# Patient Record
Sex: MALE | Race: OTHER
[De-identification: names, ages, dates, MRNs, and addresses within clinical notes are randomized per-mention and may not be internally consistent; named-entity substitution may affect disease eponyms.]

---

## 2019-02-11 ENCOUNTER — HOSPITAL ENCOUNTER (EMERGENCY)
Dept: HOSPITAL 15 - ER | Age: 64
Discharge: HOME | End: 2019-02-11
Payer: MEDICAID

## 2019-02-11 VITALS — DIASTOLIC BLOOD PRESSURE: 67 MMHG | SYSTOLIC BLOOD PRESSURE: 130 MMHG

## 2019-02-11 DIAGNOSIS — E11.9: ICD-10-CM

## 2019-02-11 DIAGNOSIS — I10: ICD-10-CM

## 2019-02-11 DIAGNOSIS — Z87.11: ICD-10-CM

## 2019-02-11 DIAGNOSIS — K21.9: ICD-10-CM

## 2019-02-11 DIAGNOSIS — Z89.612: ICD-10-CM

## 2019-02-11 DIAGNOSIS — Z87.440: ICD-10-CM

## 2019-02-11 DIAGNOSIS — J44.9: ICD-10-CM

## 2019-02-11 DIAGNOSIS — Z87.891: ICD-10-CM

## 2019-02-11 DIAGNOSIS — M19.90: ICD-10-CM

## 2019-02-11 DIAGNOSIS — Z46.6: Primary | ICD-10-CM

## 2019-02-11 DIAGNOSIS — Z48.00: ICD-10-CM

## 2019-02-11 DIAGNOSIS — Z98.890: ICD-10-CM

## 2019-02-11 DIAGNOSIS — F20.9: ICD-10-CM

## 2019-02-11 PROCEDURE — 51702 INSERT TEMP BLADDER CATH: CPT

## 2019-11-07 ENCOUNTER — HOSPITAL ENCOUNTER (INPATIENT)
Dept: HOSPITAL 15 - ER | Age: 64
LOS: 1 days | Discharge: HOME | DRG: 466 | End: 2019-11-08
Attending: INTERNAL MEDICINE | Admitting: INTERNAL MEDICINE
Payer: MEDICAID

## 2019-11-07 VITALS — HEIGHT: 67 IN | WEIGHT: 101.63 LBS | BODY MASS INDEX: 15.95 KG/M2

## 2019-11-07 VITALS — SYSTOLIC BLOOD PRESSURE: 110 MMHG | DIASTOLIC BLOOD PRESSURE: 52 MMHG

## 2019-11-07 VITALS — DIASTOLIC BLOOD PRESSURE: 53 MMHG | SYSTOLIC BLOOD PRESSURE: 103 MMHG

## 2019-11-07 DIAGNOSIS — Z93.1: ICD-10-CM

## 2019-11-07 DIAGNOSIS — Z89.612: ICD-10-CM

## 2019-11-07 DIAGNOSIS — K80.20: ICD-10-CM

## 2019-11-07 DIAGNOSIS — N21.0: ICD-10-CM

## 2019-11-07 DIAGNOSIS — I70.8: ICD-10-CM

## 2019-11-07 DIAGNOSIS — I10: ICD-10-CM

## 2019-11-07 DIAGNOSIS — Z74.01: ICD-10-CM

## 2019-11-07 DIAGNOSIS — K57.30: ICD-10-CM

## 2019-11-07 DIAGNOSIS — Z89.511: ICD-10-CM

## 2019-11-07 DIAGNOSIS — T83.511A: Primary | ICD-10-CM

## 2019-11-07 DIAGNOSIS — F32.9: ICD-10-CM

## 2019-11-07 DIAGNOSIS — F20.9: ICD-10-CM

## 2019-11-07 DIAGNOSIS — E44.1: ICD-10-CM

## 2019-11-07 LAB
ALBUMIN SERPL-MCNC: 3.3 G/DL (ref 3.4–5)
ALP SERPL-CCNC: 76 U/L (ref 45–117)
ALT SERPL-CCNC: 19 U/L (ref 16–61)
ANION GAP SERPL CALCULATED.3IONS-SCNC: 10 MMOL/L (ref 5–15)
BILIRUB SERPL-MCNC: 0.5 MG/DL (ref 0.2–1)
BUN SERPL-MCNC: 43 MG/DL (ref 7–18)
BUN/CREAT SERPL: 72.9
CALCIUM SERPL-MCNC: 9.7 MG/DL (ref 8.5–10.1)
CHLORIDE SERPL-SCNC: 107 MMOL/L (ref 98–107)
CO2 SERPL-SCNC: 22 MMOL/L (ref 21–32)
GLUCOSE SERPL-MCNC: 144 MG/DL (ref 74–106)
HCT VFR BLD AUTO: 38.9 % (ref 41–53)
HGB BLD-MCNC: 13.5 G/DL (ref 13.5–17.5)
LACTATE PLASV-SCNC: 2.3 MMOL/L (ref 0.4–2)
MCH RBC QN AUTO: 32.1 PG (ref 28–32)
MCV RBC AUTO: 92.7 FL (ref 80–100)
NRBC BLD QL AUTO: 0.1 %
POTASSIUM SERPL-SCNC: 4.3 MMOL/L (ref 3.5–5.1)
PROT SERPL-MCNC: 8.1 G/DL (ref 6.4–8.2)
SODIUM SERPL-SCNC: 139 MMOL/L (ref 136–145)

## 2019-11-07 PROCEDURE — 83605 ASSAY OF LACTIC ACID: CPT

## 2019-11-07 PROCEDURE — 96365 THER/PROPH/DIAG IV INF INIT: CPT

## 2019-11-07 PROCEDURE — 96375 TX/PRO/DX INJ NEW DRUG ADDON: CPT

## 2019-11-07 PROCEDURE — 87086 URINE CULTURE/COLONY COUNT: CPT

## 2019-11-07 PROCEDURE — 80053 COMPREHEN METABOLIC PANEL: CPT

## 2019-11-07 PROCEDURE — 82150 ASSAY OF AMYLASE: CPT

## 2019-11-07 PROCEDURE — 83690 ASSAY OF LIPASE: CPT

## 2019-11-07 PROCEDURE — 96361 HYDRATE IV INFUSION ADD-ON: CPT

## 2019-11-07 PROCEDURE — 81001 URINALYSIS AUTO W/SCOPE: CPT

## 2019-11-07 PROCEDURE — 85025 COMPLETE CBC W/AUTO DIFF WBC: CPT

## 2019-11-07 PROCEDURE — 36415 COLL VENOUS BLD VENIPUNCTURE: CPT

## 2019-11-07 PROCEDURE — 87040 BLOOD CULTURE FOR BACTERIA: CPT

## 2019-11-07 PROCEDURE — 83036 HEMOGLOBIN GLYCOSYLATED A1C: CPT

## 2019-11-07 PROCEDURE — 87081 CULTURE SCREEN ONLY: CPT

## 2019-11-07 PROCEDURE — 94761 N-INVAS EAR/PLS OXIMETRY MLT: CPT

## 2019-11-07 PROCEDURE — 82962 GLUCOSE BLOOD TEST: CPT

## 2019-11-07 PROCEDURE — 74176 CT ABD & PELVIS W/O CONTRAST: CPT

## 2019-11-07 RX ADMIN — Medication SCH STRIP: at 17:00

## 2019-11-07 RX ADMIN — SODIUM CHLORIDE SCH MG: 9 INJECTION, SOLUTION INTRAVENOUS at 12:26

## 2019-11-07 RX ADMIN — NYSTATIN SCH APPLIC: 100000 POWDER TOPICAL at 22:13

## 2019-11-07 RX ADMIN — SODIUM CHLORIDE SCH MLS/HR: 0.9 INJECTION, SOLUTION INTRAVENOUS at 11:42

## 2019-11-07 RX ADMIN — SODIUM CHLORIDE SCH MLS/HR: 0.9 INJECTION, SOLUTION INTRAVENOUS at 21:54

## 2019-11-07 RX ADMIN — Medication SCH STRIP: at 21:54

## 2019-11-07 RX ADMIN — HUMAN INSULIN SCH UNITS: 100 INJECTION, SOLUTION SUBCUTANEOUS at 12:26

## 2019-11-07 RX ADMIN — HUMAN INSULIN SCH UNITS: 100 INJECTION, SOLUTION SUBCUTANEOUS at 17:00

## 2019-11-07 RX ADMIN — Medication SCH STRIP: at 12:26

## 2019-11-07 RX ADMIN — SODIUM CHLORIDE SCH MG: 9 INJECTION, SOLUTION INTRAVENOUS at 21:50

## 2019-11-07 RX ADMIN — HUMAN INSULIN SCH UNITS: 100 INJECTION, SOLUTION SUBCUTANEOUS at 21:54

## 2019-11-07 NOTE — NUR
Opening shift note

Patient in bed alert and oriented x 4, verbally coherent, able to make needs known. Patient 
denies pain and discomfort at this time.   Plan of care discussed, patient verbalized 
understanding. All needs attended, will continue to monitor.

## 2019-11-07 NOTE — NUR
[facility] admit from ER

DAY,MARIUM XIE admitted to [facility] after SBAR received. Patient oriented to TERESA FOX, primary 
RN, unit, room, bed, and unit policies regarding patient care and visiting hours. Patient 
weighed by bedscale and encouraged to call if they need something. All questions and 
concerns addressed, patient verbalized understanding.

## 2019-11-08 VITALS — SYSTOLIC BLOOD PRESSURE: 95 MMHG | DIASTOLIC BLOOD PRESSURE: 44 MMHG

## 2019-11-08 VITALS — SYSTOLIC BLOOD PRESSURE: 102 MMHG | DIASTOLIC BLOOD PRESSURE: 48 MMHG

## 2019-11-08 VITALS — SYSTOLIC BLOOD PRESSURE: 105 MMHG | DIASTOLIC BLOOD PRESSURE: 47 MMHG

## 2019-11-08 VITALS — DIASTOLIC BLOOD PRESSURE: 57 MMHG | SYSTOLIC BLOOD PRESSURE: 102 MMHG

## 2019-11-08 VITALS — DIASTOLIC BLOOD PRESSURE: 44 MMHG | SYSTOLIC BLOOD PRESSURE: 95 MMHG

## 2019-11-08 VITALS — DIASTOLIC BLOOD PRESSURE: 52 MMHG | SYSTOLIC BLOOD PRESSURE: 105 MMHG

## 2019-11-08 LAB
ALBUMIN SERPL-MCNC: 2.5 G/DL (ref 3.4–5)
ALP SERPL-CCNC: 59 U/L (ref 45–117)
ALT SERPL-CCNC: 15 U/L (ref 16–61)
AMYLASE SERPL-CCNC: 56 U/L (ref 25–115)
ANION GAP SERPL CALCULATED.3IONS-SCNC: 7 MMOL/L (ref 5–15)
BILIRUB SERPL-MCNC: 0.3 MG/DL (ref 0.2–1)
BUN SERPL-MCNC: 31 MG/DL (ref 7–18)
BUN/CREAT SERPL: 88.6
CALCIUM SERPL-MCNC: 8.4 MG/DL (ref 8.5–10.1)
CHLORIDE SERPL-SCNC: 116 MMOL/L (ref 98–107)
CO2 SERPL-SCNC: 23 MMOL/L (ref 21–32)
GLUCOSE SERPL-MCNC: 81 MG/DL (ref 74–106)
HCT VFR BLD AUTO: 30.4 % (ref 41–53)
HGB BLD-MCNC: 10.6 G/DL (ref 13.5–17.5)
LIPASE SERPL-CCNC: 105 U/L (ref 73–393)
MCH RBC QN AUTO: 32.8 PG (ref 28–32)
MCV RBC AUTO: 93.7 FL (ref 80–100)
NRBC BLD QL AUTO: 0.1 %
POTASSIUM SERPL-SCNC: 3.4 MMOL/L (ref 3.5–5.1)
PROT SERPL-MCNC: 6.4 G/DL (ref 6.4–8.2)
SODIUM SERPL-SCNC: 146 MMOL/L (ref 136–145)

## 2019-11-08 RX ADMIN — Medication SCH STRIP: at 17:00

## 2019-11-08 RX ADMIN — Medication SCH STRIP: at 11:30

## 2019-11-08 RX ADMIN — HUMAN INSULIN SCH UNITS: 100 INJECTION, SOLUTION SUBCUTANEOUS at 22:00

## 2019-11-08 RX ADMIN — HUMAN INSULIN SCH UNITS: 100 INJECTION, SOLUTION SUBCUTANEOUS at 06:01

## 2019-11-08 RX ADMIN — NYSTATIN SCH APPLIC: 100000 POWDER TOPICAL at 11:00

## 2019-11-08 RX ADMIN — HUMAN INSULIN SCH UNITS: 100 INJECTION, SOLUTION SUBCUTANEOUS at 17:00

## 2019-11-08 RX ADMIN — Medication SCH STRIP: at 22:03

## 2019-11-08 RX ADMIN — SODIUM CHLORIDE SCH MG: 9 INJECTION, SOLUTION INTRAVENOUS at 22:30

## 2019-11-08 RX ADMIN — NYSTATIN SCH APPLIC: 100000 POWDER TOPICAL at 22:00

## 2019-11-08 RX ADMIN — HUMAN INSULIN SCH UNITS: 100 INJECTION, SOLUTION SUBCUTANEOUS at 11:30

## 2019-11-08 RX ADMIN — Medication SCH STRIP: at 06:01

## 2019-11-08 RX ADMIN — SODIUM CHLORIDE SCH MG: 9 INJECTION, SOLUTION INTRAVENOUS at 11:00

## 2019-11-08 NOTE — NUR
Nutrition Assessment Notes



please see attached link for complete assessment



Est. Needs based on IBW (67 kg): [address] kcal (23-25 kcal/kgBW r/t BKA), 67-80 gms pro 
(1.0-1.2 gms/kgBW r/t wound). Will continue to monitor pertinent labs and reassess nutrient 
need prn

-------------------------------------------------------------------------------

Addendum: 11/08/19 at 1222 by Yue Tucker RD

-------------------------------------------------------------------------------

Amended: Links added.

## 2019-11-08 NOTE — NUR
FAMILY

NEPHEWDIONNA, AT BEDSIDE. BROUGHT CLOTHES FOR PATIENT, AND TOOK PRESCRIPTION TO  FROM 
PHARMACY.

## 2019-11-08 NOTE — NUR
OPENING SHIFT NOTE:

Received report from NOC RNJudy.  Assumed care of patient.  Patient laying  in bed, 
states pain is 5/10 and is tolerable.  Bed in lowest position, rails x2 up and call light 
within reach.  Updated on plan of care.  Will continue to monitor.

## 2019-11-08 NOTE — NUR
Opening notes

Received report from noc RN, Judy.  Assumed care of patient.  Patient laying in bed, 
states pain is 0/10 and .  Bed in lowest position, rails x2 up and call light within reach. 
HOB 20 degrees. Discussed POC with patient.  Will continue to monitor. Called son to update 
on POC.

## 2019-11-08 NOTE — NUR
assessment  re:  consult needs caregiver

Patient is a 64 year old male who is alert and oriented. Patients cognitive abilities are 
intact. Prior to admission patient lived home with family and functioned with assistance. 
Per patient he will return home to his prior living arrangements post discharge and his 
nephew Anam will transport him home. Patient has a ss consult for needing caregiver. I 
provided patient with resources for [facility] caregiver. Patient has a wheelchair and hospital 
bed for home use. Patient feels safe returning home on discharge. I informed patient he has 
a right to speak to a  regarding all care. I informed patient he has a right to 
participate in any and all discharge planning.  Patient is aware of visiting hours on the 
hospital floor.  I informed patient he has a right to privacy.  Patient has a POA and 
advanced directive. Patient verbalized understanding and agreed to discharge plan.

-------------------------------------------------------------------------------

Addendum: 11/11/19 at 1141 by Caroline LINO

-------------------------------------------------------------------------------

Amended: Links added.

## 2019-11-08 NOTE — NUR
FAMILY:

Spoke to Anam, patient's family on phone.  Made family aware of patient's discharge.  
Family states will be in later this evening, after 7.  To bring patient home.

## 2019-11-08 NOTE — NUR
Patient complaining of mild body pain. Patient on a clear liquid diet, pt has acetaminophen 
for pain, pt doesn't want to have it crushed, per patient will try to swallow it whole with 
apple juice. Nurse at bedside, assisted patient with medication. Pt able to swallow 
medication without incident. Will continue to monitor.

## 2019-11-08 NOTE — NUR
WOUND CARE NOTE: 

Wound Care in to see patient per wound care request regarding "Rt. knee skin tear"  that are 
noted present on admission. Bedside nurse took photograph of patient's wounds  upon 
admission for reference. Patient is 64 years old male admitted for Abdominal Pain.  He has 
history of   anemia, arthritis, asthma, COPD,CVA, Depression, DM, GERD, htn, PUD, 
Schizophrenia and UTI.  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  Patient is resting in bed 
in Rm. 239A. He's  awake, alert and oriented. He's in no stated pain at this time. He's 
contracted but he's able to assist in turning and repositioning by grabbing onto bed rails. 
His current Edin score is 13.  



Noted patient's Rt knee has 1.5x2cm  open partial thickness wound and to his Rt lower leg 
just below the knee is 7x5cm open partial thickness wound. Wounds are red with pink everardo 
wound, scant sanguineous drainage noted, no odor noted.  Patient is not aware how he got the 
Rt lower leg wound and states could be from rubbing off from bed. Cleansed Rt knee and lower 
leg wounds with wound cleanser,patted dry with gauze, applied Thera honey gel and covered 
with Opti foam gentle dressing. Patient's sacrum has intact pink collagen scar, history of 
sacral pressure injury. Patient is thin, weighs 46.1 kg, and he has prominent coccyx bone.  
Everardo care given, applied barrier cream to sacral, buttocks and covered with upper sacrum 
with Opti foam sacral dressing as preventative.  Patient's Lt AKA stump has well healed 
scar.  Patient's education given regarding skin/ wound care, wound healing and skin 
protection measures. Patient verbalized understanding. Repositioned patient for comfort 
facing his Rt side, redistributed pressure points with pillows. Bed in low position, call 
bell within reach,bed alarm on.



RECOMMENDATION: BID/PRN cleaning and application of barrier cream to sacrum with application 
of Opti foam sacral dressing as preventative, dietary consult, Q3D/PRN dressing change to Rt 
knee, lower leg wounds per MD order, frequent turning and repositioning schedule as 
condition permits, redistribute pressure points with pillows, air mattress (ordered ), 
continue monitoring by wound care while patient is hospitalized.

-------------------------------------------------------------------------------

Addendum: 11/08/19 at 1618 by Shanita Tom RN

-------------------------------------------------------------------------------

Amended: Links added.

## 2021-07-31 ENCOUNTER — HOSPITAL ENCOUNTER (INPATIENT)
Dept: HOSPITAL 15 - ER | Age: 66
LOS: 3 days | Discharge: HOME HEALTH | DRG: 720 | End: 2021-08-03
Attending: FAMILY MEDICINE | Admitting: INTERNAL MEDICINE
Payer: MEDICAID

## 2021-07-31 VITALS — DIASTOLIC BLOOD PRESSURE: 60 MMHG | SYSTOLIC BLOOD PRESSURE: 117 MMHG

## 2021-07-31 VITALS — HEIGHT: 73 IN | BODY MASS INDEX: 15.37 KG/M2 | WEIGHT: 115.96 LBS

## 2021-07-31 VITALS — SYSTOLIC BLOOD PRESSURE: 114 MMHG | DIASTOLIC BLOOD PRESSURE: 54 MMHG

## 2021-07-31 VITALS — SYSTOLIC BLOOD PRESSURE: 112 MMHG | DIASTOLIC BLOOD PRESSURE: 63 MMHG

## 2021-07-31 VITALS — DIASTOLIC BLOOD PRESSURE: 55 MMHG | SYSTOLIC BLOOD PRESSURE: 113 MMHG

## 2021-07-31 VITALS — SYSTOLIC BLOOD PRESSURE: 118 MMHG | DIASTOLIC BLOOD PRESSURE: 67 MMHG

## 2021-07-31 VITALS — DIASTOLIC BLOOD PRESSURE: 55 MMHG | SYSTOLIC BLOOD PRESSURE: 99 MMHG

## 2021-07-31 DIAGNOSIS — N32.89: ICD-10-CM

## 2021-07-31 DIAGNOSIS — F20.9: ICD-10-CM

## 2021-07-31 DIAGNOSIS — Z93.1: ICD-10-CM

## 2021-07-31 DIAGNOSIS — M19.90: ICD-10-CM

## 2021-07-31 DIAGNOSIS — K21.9: ICD-10-CM

## 2021-07-31 DIAGNOSIS — I10: ICD-10-CM

## 2021-07-31 DIAGNOSIS — K22.2: ICD-10-CM

## 2021-07-31 DIAGNOSIS — Z93.3: ICD-10-CM

## 2021-07-31 DIAGNOSIS — K44.9: ICD-10-CM

## 2021-07-31 DIAGNOSIS — E43: ICD-10-CM

## 2021-07-31 DIAGNOSIS — E11.21: ICD-10-CM

## 2021-07-31 DIAGNOSIS — K86.89: ICD-10-CM

## 2021-07-31 DIAGNOSIS — Z87.891: ICD-10-CM

## 2021-07-31 DIAGNOSIS — D62: ICD-10-CM

## 2021-07-31 DIAGNOSIS — E11.40: ICD-10-CM

## 2021-07-31 DIAGNOSIS — F32.9: ICD-10-CM

## 2021-07-31 DIAGNOSIS — N20.0: ICD-10-CM

## 2021-07-31 DIAGNOSIS — Z89.612: ICD-10-CM

## 2021-07-31 DIAGNOSIS — N39.0: ICD-10-CM

## 2021-07-31 DIAGNOSIS — N21.0: ICD-10-CM

## 2021-07-31 DIAGNOSIS — Z89.511: ICD-10-CM

## 2021-07-31 DIAGNOSIS — Z89.512: ICD-10-CM

## 2021-07-31 DIAGNOSIS — K92.2: ICD-10-CM

## 2021-07-31 DIAGNOSIS — E86.0: ICD-10-CM

## 2021-07-31 DIAGNOSIS — A41.9: Primary | ICD-10-CM

## 2021-07-31 DIAGNOSIS — Z20.822: ICD-10-CM

## 2021-07-31 DIAGNOSIS — Z87.11: ICD-10-CM

## 2021-07-31 DIAGNOSIS — Z82.49: ICD-10-CM

## 2021-07-31 DIAGNOSIS — J44.9: ICD-10-CM

## 2021-07-31 DIAGNOSIS — Z86.718: ICD-10-CM

## 2021-07-31 DIAGNOSIS — Z86.73: ICD-10-CM

## 2021-07-31 LAB
ALBUMIN SERPL-MCNC: 2.4 G/DL (ref 3.4–5)
ALP SERPL-CCNC: 61 U/L (ref 45–117)
ALT SERPL-CCNC: 29 U/L (ref 16–61)
ANION GAP SERPL CALCULATED.3IONS-SCNC: 5 MMOL/L (ref 5–15)
BILIRUB SERPL-MCNC: 0.2 MG/DL (ref 0.2–1)
BUN SERPL-MCNC: 81 MG/DL (ref 7–18)
BUN/CREAT SERPL: 165.3
CALCIUM SERPL-MCNC: 7.8 MG/DL (ref 8.5–10.1)
CHLORIDE SERPL-SCNC: 110 MMOL/L (ref 98–107)
CO2 SERPL-SCNC: 24 MMOL/L (ref 21–32)
CRYSTALS UR QL AUTO: (no result) /HPF
GLUCOSE SERPL-MCNC: 194 MG/DL (ref 74–106)
HCT VFR BLD AUTO: 18.4 % (ref 41–53)
HCT VFR BLD AUTO: 29.1 % (ref 41–53)
HGB BLD-MCNC: 10 G/DL (ref 13.5–17.5)
HGB BLD-MCNC: 6.3 G/DL (ref 13.5–17.5)
INR PPP: 1.35 (ref 0.9–1.15)
LACTATE PLASV-SCNC: 2.1 MMOL/L (ref 0.4–2)
LIPASE SERPL-CCNC: 39 U/L (ref 73–393)
MCH RBC QN AUTO: 32.8 PG (ref 28–32)
MCV RBC AUTO: 96.1 FL (ref 80–100)
NRBC BLD QL AUTO: 0 %
POTASSIUM SERPL-SCNC: 4.1 MMOL/L (ref 3.5–5.1)
PROT SERPL-MCNC: 6.3 G/DL (ref 6.4–8.2)
SODIUM SERPL-SCNC: 139 MMOL/L (ref 136–145)
WBC CLUMPS UR QL AUTO: PRESENT /HPF

## 2021-07-31 PROCEDURE — 86900 BLOOD TYPING SEROLOGIC ABO: CPT

## 2021-07-31 PROCEDURE — 81001 URINALYSIS AUTO W/SCOPE: CPT

## 2021-07-31 PROCEDURE — 87086 URINE CULTURE/COLONY COUNT: CPT

## 2021-07-31 PROCEDURE — 96368 THER/DIAG CONCURRENT INF: CPT

## 2021-07-31 PROCEDURE — 85025 COMPLETE CBC W/AUTO DIFF WBC: CPT

## 2021-07-31 PROCEDURE — 30233N1 TRANSFUSION OF NONAUTOLOGOUS RED BLOOD CELLS INTO PERIPHERAL VEIN, PERCUTANEOUS APPROACH: ICD-10-PCS | Performed by: INTERNAL MEDICINE

## 2021-07-31 PROCEDURE — 85045 AUTOMATED RETICULOCYTE COUNT: CPT

## 2021-07-31 PROCEDURE — 99291 CRITICAL CARE FIRST HOUR: CPT

## 2021-07-31 PROCEDURE — 86850 RBC ANTIBODY SCREEN: CPT

## 2021-07-31 PROCEDURE — 83605 ASSAY OF LACTIC ACID: CPT

## 2021-07-31 PROCEDURE — 86920 COMPATIBILITY TEST SPIN: CPT

## 2021-07-31 PROCEDURE — 85014 HEMATOCRIT: CPT

## 2021-07-31 PROCEDURE — 80053 COMPREHEN METABOLIC PANEL: CPT

## 2021-07-31 PROCEDURE — 83880 ASSAY OF NATRIURETIC PEPTIDE: CPT

## 2021-07-31 PROCEDURE — 85610 PROTHROMBIN TIME: CPT

## 2021-07-31 PROCEDURE — 84484 ASSAY OF TROPONIN QUANT: CPT

## 2021-07-31 PROCEDURE — 74176 CT ABD & PELVIS W/O CONTRAST: CPT

## 2021-07-31 PROCEDURE — 86901 BLOOD TYPING SEROLOGIC RH(D): CPT

## 2021-07-31 PROCEDURE — 96375 TX/PRO/DX INJ NEW DRUG ADDON: CPT

## 2021-07-31 PROCEDURE — 96365 THER/PROPH/DIAG IV INF INIT: CPT

## 2021-07-31 PROCEDURE — 83690 ASSAY OF LIPASE: CPT

## 2021-07-31 PROCEDURE — 96361 HYDRATE IV INFUSION ADD-ON: CPT

## 2021-07-31 PROCEDURE — 85018 HEMOGLOBIN: CPT

## 2021-07-31 PROCEDURE — 87426 SARSCOV CORONAVIRUS AG IA: CPT

## 2021-07-31 PROCEDURE — 71045 X-RAY EXAM CHEST 1 VIEW: CPT

## 2021-07-31 PROCEDURE — 87040 BLOOD CULTURE FOR BACTERIA: CPT

## 2021-07-31 PROCEDURE — 36415 COLL VENOUS BLD VENIPUNCTURE: CPT

## 2021-07-31 PROCEDURE — 93005 ELECTROCARDIOGRAM TRACING: CPT

## 2021-07-31 PROCEDURE — 82150 ASSAY OF AMYLASE: CPT

## 2021-07-31 RX ADMIN — SODIUM CHLORIDE SCH MLS/HR: 0.9 INJECTION, SOLUTION INTRAVENOUS at 18:26

## 2021-08-01 VITALS — SYSTOLIC BLOOD PRESSURE: 105 MMHG | DIASTOLIC BLOOD PRESSURE: 52 MMHG

## 2021-08-01 LAB
ALBUMIN SERPL-MCNC: 2 G/DL (ref 3.4–5)
ALP SERPL-CCNC: 50 U/L (ref 45–117)
ALT SERPL-CCNC: 24 U/L (ref 16–61)
ANION GAP SERPL CALCULATED.3IONS-SCNC: 4 MMOL/L (ref 5–15)
BILIRUB SERPL-MCNC: 0.6 MG/DL (ref 0.2–1)
BUN SERPL-MCNC: 51 MG/DL (ref 7–18)
BUN/CREAT SERPL: 159.4
CALCIUM SERPL-MCNC: 7.4 MG/DL (ref 8.5–10.1)
CHLORIDE SERPL-SCNC: 121 MMOL/L (ref 98–107)
CO2 SERPL-SCNC: 22 MMOL/L (ref 21–32)
GLUCOSE SERPL-MCNC: 109 MG/DL (ref 74–106)
HCT VFR BLD AUTO: 27.9 % (ref 41–53)
HGB BLD-MCNC: 9.7 G/DL (ref 13.5–17.5)
MCH RBC QN AUTO: 30.8 PG (ref 28–32)
MCV RBC AUTO: 89.2 FL (ref 80–100)
NRBC BLD QL AUTO: 0.1 %
POTASSIUM SERPL-SCNC: 3.7 MMOL/L (ref 3.5–5.1)
PROT SERPL-MCNC: 5.4 G/DL (ref 6.4–8.2)
SODIUM SERPL-SCNC: 147 MMOL/L (ref 136–145)

## 2021-08-01 PROCEDURE — 0DB28ZX EXCISION OF MIDDLE ESOPHAGUS, VIA NATURAL OR ARTIFICIAL OPENING ENDOSCOPIC, DIAGNOSTIC: ICD-10-PCS | Performed by: INTERNAL MEDICINE

## 2021-08-01 RX ADMIN — SODIUM CHLORIDE SCH MLS/HR: 0.9 INJECTION, SOLUTION INTRAVENOUS at 21:40

## 2021-08-01 RX ADMIN — SODIUM CHLORIDE SCH MLS/HR: 0.9 INJECTION, SOLUTION INTRAVENOUS at 09:15

## 2021-08-01 RX ADMIN — SODIUM CHLORIDE SCH MLS/HR: 0.9 INJECTION, SOLUTION INTRAVENOUS at 09:13

## 2021-08-01 RX ADMIN — PANTOPRAZOLE SODIUM SCH MG: 40 INJECTION, POWDER, FOR SOLUTION INTRAVENOUS at 21:39

## 2021-08-01 RX ADMIN — SODIUM CHLORIDE SCH MLS/HR: 0.9 INJECTION, SOLUTION INTRAVENOUS at 13:56

## 2021-08-02 VITALS — DIASTOLIC BLOOD PRESSURE: 52 MMHG | SYSTOLIC BLOOD PRESSURE: 112 MMHG

## 2021-08-02 VITALS — DIASTOLIC BLOOD PRESSURE: 64 MMHG | SYSTOLIC BLOOD PRESSURE: 105 MMHG

## 2021-08-02 VITALS — DIASTOLIC BLOOD PRESSURE: 60 MMHG | SYSTOLIC BLOOD PRESSURE: 116 MMHG

## 2021-08-02 VITALS — SYSTOLIC BLOOD PRESSURE: 102 MMHG | DIASTOLIC BLOOD PRESSURE: 57 MMHG

## 2021-08-02 VITALS — DIASTOLIC BLOOD PRESSURE: 58 MMHG | SYSTOLIC BLOOD PRESSURE: 108 MMHG

## 2021-08-02 RX ADMIN — CEFTRIAXONE SODIUM SCH MLS/HR: 1 INJECTION, POWDER, FOR SOLUTION INTRAMUSCULAR; INTRAVENOUS at 08:58

## 2021-08-02 RX ADMIN — PANTOPRAZOLE SODIUM SCH MG: 40 INJECTION, POWDER, FOR SOLUTION INTRAVENOUS at 21:08

## 2021-08-02 RX ADMIN — SUCRALFATE SCH GM: 1 SUSPENSION ORAL at 13:57

## 2021-08-02 RX ADMIN — SUCRALFATE SCH GM: 1 SUSPENSION ORAL at 19:14

## 2021-08-02 RX ADMIN — SUCRALFATE SCH GM: 1 SUSPENSION ORAL at 21:08

## 2021-08-02 RX ADMIN — PROMETHAZINE HYDROCHLORIDE PRN MG: 25 INJECTION INTRAMUSCULAR; INTRAVENOUS at 08:59

## 2021-08-02 RX ADMIN — MORPHINE SULFATE PRN MG: 2 INJECTION, SOLUTION INTRAMUSCULAR; INTRAVENOUS at 08:59

## 2021-08-02 RX ADMIN — PROMETHAZINE HYDROCHLORIDE PRN MG: 25 INJECTION INTRAMUSCULAR; INTRAVENOUS at 19:14

## 2021-08-02 RX ADMIN — PANTOPRAZOLE SODIUM SCH MG: 40 INJECTION, POWDER, FOR SOLUTION INTRAVENOUS at 08:59

## 2021-08-02 RX ADMIN — SODIUM CHLORIDE SCH MLS/HR: 0.9 INJECTION, SOLUTION INTRAVENOUS at 05:09

## 2021-08-02 RX ADMIN — SODIUM CHLORIDE SCH MLS/HR: 0.9 INJECTION, SOLUTION INTRAVENOUS at 13:57

## 2021-08-03 VITALS — SYSTOLIC BLOOD PRESSURE: 116 MMHG | DIASTOLIC BLOOD PRESSURE: 53 MMHG

## 2021-08-03 VITALS — DIASTOLIC BLOOD PRESSURE: 55 MMHG | SYSTOLIC BLOOD PRESSURE: 109 MMHG

## 2021-08-03 VITALS — DIASTOLIC BLOOD PRESSURE: 49 MMHG | SYSTOLIC BLOOD PRESSURE: 115 MMHG

## 2021-08-03 RX ADMIN — SODIUM CHLORIDE SCH MLS/HR: 0.9 INJECTION, SOLUTION INTRAVENOUS at 13:45

## 2021-08-03 RX ADMIN — SUCRALFATE SCH GM: 1 SUSPENSION ORAL at 06:00

## 2021-08-03 RX ADMIN — PROMETHAZINE HYDROCHLORIDE PRN MG: 25 INJECTION INTRAMUSCULAR; INTRAVENOUS at 16:22

## 2021-08-03 RX ADMIN — SUCRALFATE SCH GM: 1 SUSPENSION ORAL at 17:00

## 2021-08-03 RX ADMIN — PANTOPRAZOLE SODIUM SCH MG: 40 INJECTION, POWDER, FOR SOLUTION INTRAVENOUS at 09:03

## 2021-08-03 RX ADMIN — MORPHINE SULFATE PRN MG: 2 INJECTION, SOLUTION INTRAMUSCULAR; INTRAVENOUS at 11:53

## 2021-08-03 RX ADMIN — MORPHINE SULFATE PRN MG: 2 INJECTION, SOLUTION INTRAMUSCULAR; INTRAVENOUS at 16:21

## 2021-08-03 RX ADMIN — SODIUM CHLORIDE SCH MLS/HR: 0.9 INJECTION, SOLUTION INTRAVENOUS at 05:45

## 2021-08-03 RX ADMIN — PROMETHAZINE HYDROCHLORIDE PRN MG: 25 INJECTION INTRAMUSCULAR; INTRAVENOUS at 07:48

## 2021-08-03 RX ADMIN — SUCRALFATE SCH GM: 1 SUSPENSION ORAL at 11:52

## 2021-08-03 RX ADMIN — PROMETHAZINE HYDROCHLORIDE PRN MG: 25 INJECTION INTRAMUSCULAR; INTRAVENOUS at 11:53

## 2021-08-03 RX ADMIN — CEFTRIAXONE SODIUM SCH MLS/HR: 1 INJECTION, POWDER, FOR SOLUTION INTRAMUSCULAR; INTRAVENOUS at 09:03

## 2021-08-03 RX ADMIN — SODIUM CHLORIDE SCH MLS/HR: 0.9 INJECTION, SOLUTION INTRAVENOUS at 00:22

## 2021-08-03 RX ADMIN — PROMETHAZINE HYDROCHLORIDE PRN MG: 25 INJECTION INTRAMUSCULAR; INTRAVENOUS at 00:36

## 2021-10-16 ENCOUNTER — HOSPITAL ENCOUNTER (EMERGENCY)
Dept: HOSPITAL 15 - ER | Age: 66
End: 2021-10-16
Payer: MEDICAID

## 2021-10-16 VITALS — WEIGHT: 85 LBS | HEIGHT: 60 IN | BODY MASS INDEX: 16.69 KG/M2

## 2021-10-16 VITALS — SYSTOLIC BLOOD PRESSURE: 75 MMHG | DIASTOLIC BLOOD PRESSURE: 46 MMHG

## 2021-10-16 DIAGNOSIS — R19.7: ICD-10-CM

## 2021-10-16 DIAGNOSIS — F32.9: ICD-10-CM

## 2021-10-16 DIAGNOSIS — Z89.619: ICD-10-CM

## 2021-10-16 DIAGNOSIS — K21.9: ICD-10-CM

## 2021-10-16 DIAGNOSIS — Z86.73: ICD-10-CM

## 2021-10-16 DIAGNOSIS — F20.9: ICD-10-CM

## 2021-10-16 DIAGNOSIS — Z79.899: ICD-10-CM

## 2021-10-16 DIAGNOSIS — Z87.891: ICD-10-CM

## 2021-10-16 DIAGNOSIS — Z87.440: ICD-10-CM

## 2021-10-16 DIAGNOSIS — E11.9: ICD-10-CM

## 2021-10-16 DIAGNOSIS — J44.9: ICD-10-CM

## 2021-10-16 DIAGNOSIS — I95.9: ICD-10-CM

## 2021-10-16 DIAGNOSIS — D64.9: ICD-10-CM

## 2021-10-16 DIAGNOSIS — K92.2: Primary | ICD-10-CM

## 2021-10-16 DIAGNOSIS — Z87.11: ICD-10-CM

## 2021-10-16 LAB
ALBUMIN SERPL-MCNC: 1.9 G/DL (ref 3.4–5)
ALP SERPL-CCNC: 54 U/L (ref 45–117)
ALT SERPL-CCNC: 17 U/L (ref 16–61)
ANION GAP SERPL CALCULATED.3IONS-SCNC: 14 MMOL/L (ref 5–15)
BILIRUB SERPL-MCNC: 0.2 MG/DL (ref 0.2–1)
BUN SERPL-MCNC: 39 MG/DL (ref 7–18)
BUN/CREAT SERPL: 50
CALCIUM SERPL-MCNC: 7.4 MG/DL (ref 8.5–10.1)
CHLORIDE SERPL-SCNC: 103 MMOL/L (ref 98–107)
CO2 SERPL-SCNC: 19 MMOL/L (ref 21–32)
GLUCOSE SERPL-MCNC: 257 MG/DL (ref 74–106)
HCT VFR BLD AUTO: 8.6 % (ref 41–53)
HGB BLD-MCNC: 2.6 G/DL (ref 13.5–17.5)
LIPASE SERPL-CCNC: 36 U/L (ref 73–393)
MCH RBC QN AUTO: 27.8 PG (ref 28–32)
MCV RBC AUTO: 92.9 FL (ref 80–100)
NRBC BLD QL AUTO: 0.4 %
POTASSIUM SERPL-SCNC: 3.8 MMOL/L (ref 3.5–5.1)
PROT SERPL-MCNC: 5.2 G/DL (ref 6.4–8.2)
SODIUM SERPL-SCNC: 136 MMOL/L (ref 136–145)

## 2021-10-16 PROCEDURE — 94002 VENT MGMT INPAT INIT DAY: CPT

## 2021-10-16 PROCEDURE — 83690 ASSAY OF LIPASE: CPT

## 2021-10-16 PROCEDURE — 31500 INSERT EMERGENCY AIRWAY: CPT

## 2021-10-16 PROCEDURE — 80053 COMPREHEN METABOLIC PANEL: CPT

## 2021-10-16 PROCEDURE — 86920 COMPATIBILITY TEST SPIN: CPT

## 2021-10-16 PROCEDURE — 85025 COMPLETE CBC W/AUTO DIFF WBC: CPT

## 2021-10-16 PROCEDURE — 84484 ASSAY OF TROPONIN QUANT: CPT

## 2021-10-16 PROCEDURE — 86850 RBC ANTIBODY SCREEN: CPT

## 2021-10-16 PROCEDURE — 82805 BLOOD GASES W/O2 SATURATION: CPT

## 2021-10-16 PROCEDURE — 93005 ELECTROCARDIOGRAM TRACING: CPT

## 2021-10-16 PROCEDURE — 96365 THER/PROPH/DIAG IV INF INIT: CPT

## 2021-10-16 PROCEDURE — 86901 BLOOD TYPING SEROLOGIC RH(D): CPT

## 2021-10-16 PROCEDURE — 96366 THER/PROPH/DIAG IV INF ADDON: CPT

## 2021-10-16 PROCEDURE — 81001 URINALYSIS AUTO W/SCOPE: CPT

## 2021-10-16 PROCEDURE — 92950 HEART/LUNG RESUSCITATION CPR: CPT

## 2021-10-16 PROCEDURE — 96368 THER/DIAG CONCURRENT INF: CPT

## 2021-10-16 PROCEDURE — 36415 COLL VENOUS BLD VENIPUNCTURE: CPT

## 2021-10-16 PROCEDURE — 86900 BLOOD TYPING SEROLOGIC ABO: CPT

## 2021-10-16 PROCEDURE — 99291 CRITICAL CARE FIRST HOUR: CPT

## 2021-10-16 PROCEDURE — 36600 WITHDRAWAL OF ARTERIAL BLOOD: CPT

## 2021-10-16 PROCEDURE — 36556 INSERT NON-TUNNEL CV CATH: CPT
